# Patient Record
Sex: FEMALE | Race: WHITE | HISPANIC OR LATINO | Employment: UNEMPLOYED | URBAN - METROPOLITAN AREA
[De-identification: names, ages, dates, MRNs, and addresses within clinical notes are randomized per-mention and may not be internally consistent; named-entity substitution may affect disease eponyms.]

---

## 2022-08-01 ENCOUNTER — OFFICE VISIT (OUTPATIENT)
Dept: URGENT CARE | Facility: CLINIC | Age: 62
End: 2022-08-01
Payer: OTHER MISCELLANEOUS

## 2022-08-01 VITALS
SYSTOLIC BLOOD PRESSURE: 112 MMHG | HEART RATE: 74 BPM | DIASTOLIC BLOOD PRESSURE: 70 MMHG | HEIGHT: 65 IN | WEIGHT: 203 LBS | OXYGEN SATURATION: 95 % | RESPIRATION RATE: 16 BRPM | TEMPERATURE: 97.2 F | BODY MASS INDEX: 33.82 KG/M2

## 2022-08-01 DIAGNOSIS — H10.31 ACUTE CONJUNCTIVITIS OF RIGHT EYE, UNSPECIFIED ACUTE CONJUNCTIVITIS TYPE: ICD-10-CM

## 2022-08-01 PROCEDURE — 99203 OFFICE O/P NEW LOW 30 MIN: CPT | Performed by: PHYSICIAN ASSISTANT

## 2022-08-01 RX ORDER — PREDNISONE 5 MG/1
5 TABLET ORAL DAILY
COMMUNITY

## 2022-08-01 RX ORDER — POLYMYXIN B SULFATE AND TRIMETHOPRIM 1; 10000 MG/ML; [USP'U]/ML
1 SOLUTION OPHTHALMIC EVERY 4 HOURS
Qty: 10 ML | Refills: 0 | Status: SHIPPED | OUTPATIENT
Start: 2022-08-01

## 2022-08-01 NOTE — PROGRESS NOTES
"Subjective:   Nayely Scott is a 61 y.o. female who presents for Pink Eye (Right  eye, started this morning )        Patient presents with her daughter today who is translating for her.  Patient presents with concerns of right-sided eye redness and yellow discharge that began this morning.  Symptoms slightly improved since initial onset.  She denies known or suspected foreign body.  She does not wear contact lenses.  She denies eye pain, vision changes, nausea, vomiting, fevers, chills.  She washed the lids this morning and states that the symptoms somewhat improved following this.      ROS    PMH:  has no past medical history on file.  MEDS:   Current Outpatient Medications:   •  Pregabalin (LYRICA PO), Take  by mouth., Disp: , Rfl:   •  predniSONE (DELTASONE) 5 MG Tab, Take 5 mg by mouth every day., Disp: , Rfl:   •  polymixin-trimethoprim (POLYTRIM) 15298-4.1 UNIT/ML-% Solution, Administer 1 Drop into both eyes every 4 hours., Disp: 10 mL, Rfl: 0  ALLERGIES: No Known Allergies  SURGHX: History reviewed. No pertinent surgical history.  SOCHX:  reports that she has never smoked. She has never used smokeless tobacco. She reports previous alcohol use.  FH: Family history was reviewed, no pertinent findings to report   Objective:   /70 (BP Location: Left arm, Patient Position: Sitting, BP Cuff Size: Adult long)   Pulse 74   Temp 36.2 °C (97.2 °F) (Temporal)   Resp 16   Ht 1.651 m (5' 5\") Comment: per pt  Wt 92.1 kg (203 lb)   SpO2 95%   BMI 33.78 kg/m²   Physical Exam  Vitals reviewed.   Constitutional:       General: She is not in acute distress.     Appearance: Normal appearance. She is well-developed. She is not toxic-appearing.   HENT:      Head: Normocephalic and atraumatic.      Right Ear: External ear normal.      Left Ear: External ear normal.      Nose: Nose normal.   Eyes:      Comments: PERRL, EOMI, bilaterally.  No nystagmus or ocular palsy.  Pain with extraocular movements.  " Moderate right-sided conjunctival injection with crusted yellow discharge at the right medial canthus.  No left-sided injection or discharge.  Anterior chambers are clear nonbulging bilaterally.  No evidence of corneal abrasion or foreign body on gross examination.   Cardiovascular:      Rate and Rhythm: Normal rate and regular rhythm.   Pulmonary:      Effort: Pulmonary effort is normal. No respiratory distress.      Breath sounds: No stridor.   Skin:     General: Skin is dry.   Neurological:      Comments: Alert and oriented.    Psychiatric:         Speech: Speech normal.         Behavior: Behavior normal.           Assessment/Plan:   1. Acute conjunctivitis of right eye, unspecified acute conjunctivitis type  - polymixin-trimethoprim (POLYTRIM) 27904-6.1 UNIT/ML-% Solution; Administer 1 Drop into both eyes every 4 hours.  Dispense: 10 mL; Refill: 0    Other orders  - Pregabalin (LYRICA PO); Take  by mouth.  - predniSONE (DELTASONE) 5 MG Tab; Take 5 mg by mouth every day.    Discard eye make up.  Launder pillow cases.  Warm we compresses several times a day.  Avoid touching eyes and good hand hygiene.    If no improvement within 48 to 72 hours or new symptoms develop recommend reevaluation.    Differential diagnosis, natural history, supportive care, and indications for immediate follow-up discussed.